# Patient Record
Sex: MALE | Race: WHITE | NOT HISPANIC OR LATINO | Employment: FULL TIME | URBAN - METROPOLITAN AREA
[De-identification: names, ages, dates, MRNs, and addresses within clinical notes are randomized per-mention and may not be internally consistent; named-entity substitution may affect disease eponyms.]

---
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
## 2022-07-20 ENCOUNTER — OFFICE VISIT (OUTPATIENT)
Dept: URGENT CARE | Facility: CLINIC | Age: 22
End: 2022-07-20
Payer: COMMERCIAL

## 2022-07-20 VITALS
OXYGEN SATURATION: 100 % | HEIGHT: 63 IN | RESPIRATION RATE: 14 BRPM | DIASTOLIC BLOOD PRESSURE: 88 MMHG | TEMPERATURE: 97 F | SYSTOLIC BLOOD PRESSURE: 119 MMHG | WEIGHT: 148 LBS | HEART RATE: 97 BPM | BODY MASS INDEX: 26.22 KG/M2

## 2022-07-20 DIAGNOSIS — R52 GENERALIZED BODY ACHES: ICD-10-CM

## 2022-07-20 DIAGNOSIS — B34.9 VIRAL INFECTION: Primary | ICD-10-CM

## 2022-07-20 PROCEDURE — 99203 OFFICE O/P NEW LOW 30 MIN: CPT | Performed by: PHYSICIAN ASSISTANT

## 2022-07-20 PROCEDURE — S9088 SERVICES PROVIDED IN URGENT: HCPCS | Performed by: PHYSICIAN ASSISTANT

## 2022-07-20 PROCEDURE — 87636 SARSCOV2 & INF A&B AMP PRB: CPT | Performed by: PHYSICIAN ASSISTANT

## 2022-07-20 NOTE — PROGRESS NOTES
[facility]        NAME: Nataliia Ferreira is a 23 y o  male  : 2000    MRN: [id number]  DATE: 2022  TIME: 7:26 PM    Assessment and Plan   Viral infection [B34 9]  1  Viral infection     2  Generalized body aches  Covid/Flu-Office Collect     Patient stable for discharge home at this time   Suspected COVID-19 infection or other viral infection  Swabbed for COVID-19/flu in office today  Continue supportive care including rest, hydration, acetaminophen and ibuprofen for pain and fever, OTC decongestants and nasal sprays, cough suppressants   Educated on return precautions to PCP or to ED for any new or worsening symptoms including difficulty breathing, chest pain, and high fever       Patient Instructions     Keep hydrated and rest as able  Check MY CHART in 24-48 hrs for Covid results  Home isolation until results come back; if + quarantine 5 days from the onset of symptoms, and fever free for 24 hrs  Wear your mask and wash hands often  PCP follow up in 3-5 days; call them first  Go to an emergency department if symptoms worsen, especially shortness or breath, weakness, or if unable to tolerate fluid intake  Chief Complaint     Chief Complaint   Patient presents with    Cold Like Symptoms    Fever         History of Present Illness       Patient is a 53-year-old male who presents with a CC of body aches and fever began in the morning  Does not know what his temperature was as his mom took his temperature while he was asleep  He states his brother and 2 nephews have roseola  Denies any nasal congestion, cough, sore throat, pain, nausea, vomiting, headaches  Also denies any rash  Review of Systems   Review of Systems   Constitutional: Positive for fever  Negative for fatigue  HENT: Negative for congestion and sore throat  Respiratory: Negative for cough and shortness of breath  Cardiovascular: Negative for chest pain     Gastrointestinal: Negative for abdominal pain, diarrhea, nausea and vomiting  Musculoskeletal: Positive for arthralgias and myalgias  Neurological: Negative for dizziness and headaches  Psychiatric/Behavioral: Negative for confusion  Current Medications       Current Outpatient Medications:     naproxen (NAPROSYN) 375 mg tablet, Take 1 tablet (375 mg total) by mouth 2 (two) times a day with meals, Disp: 20 tablet, Rfl: 0    Current Allergies     Allergies as of 07/20/2022    (No Known Allergies)            The following portions of the patient's history were reviewed and updated as appropriate: allergies, current medications, past family history, past medical history, past social history, past surgical history and problem list      No past medical history on file  No past surgical history on file  No family history on file  Medications have been verified  Objective   /88   Pulse 97   Temp (!) 97 °F (36 1 °C)   Resp 14   Ht 5' 3" (1 6 m)   Wt 67 1 kg (148 lb)   SpO2 100%   BMI 26 22 kg/m²        Physical Exam     Physical Exam  Constitutional:       General: He is not in acute distress  Appearance: Normal appearance  He is not ill-appearing or diaphoretic  HENT:      Right Ear: Tympanic membrane, ear canal and external ear normal       Left Ear: Tympanic membrane, ear canal and external ear normal       Nose: Nose normal       Mouth/Throat:      Mouth: Mucous membranes are moist       Pharynx: Oropharynx is clear  Eyes:      Conjunctiva/sclera: Conjunctivae normal    Cardiovascular:      Rate and Rhythm: Normal rate and regular rhythm  Heart sounds: Normal heart sounds  Pulmonary:      Effort: Pulmonary effort is normal       Breath sounds: Normal breath sounds  Lymphadenopathy:      Cervical: No cervical adenopathy  Skin:     General: Skin is warm and dry  Neurological:      Mental Status: He is alert     Psychiatric:         Mood and Affect: Mood normal          Behavior: Behavior normal

## 2022-07-21 LAB
FLUAV RNA RESP QL NAA+PROBE: NEGATIVE
FLUBV RNA RESP QL NAA+PROBE: NEGATIVE
SARS-COV-2 RNA RESP QL NAA+PROBE: NEGATIVE

## 2023-09-06 ENCOUNTER — HOSPITAL ENCOUNTER (EMERGENCY)
Facility: HOSPITAL | Age: 23
Discharge: HOME | End: 2023-09-06
Attending: EMERGENCY MEDICINE

## 2023-09-06 ENCOUNTER — APPOINTMENT (EMERGENCY)
Dept: RADIOLOGY | Facility: HOSPITAL | Age: 23
End: 2023-09-06

## 2023-09-06 VITALS
OXYGEN SATURATION: 99 % | WEIGHT: 150 LBS | DIASTOLIC BLOOD PRESSURE: 80 MMHG | HEART RATE: 75 BPM | RESPIRATION RATE: 19 BRPM | HEIGHT: 64 IN | TEMPERATURE: 98.5 F | SYSTOLIC BLOOD PRESSURE: 132 MMHG | BODY MASS INDEX: 25.61 KG/M2

## 2023-09-06 DIAGNOSIS — S60.10XA SUBUNGUAL HEMATOMA OF DIGIT OF HAND, INITIAL ENCOUNTER: Primary | ICD-10-CM

## 2023-09-06 PROCEDURE — 99284 EMERGENCY DEPT VISIT MOD MDM: CPT | Performed by: EMERGENCY MEDICINE

## 2023-09-06 PROCEDURE — 11740 EVACUATION SUBUNGUAL HMTMA: CPT | Performed by: EMERGENCY MEDICINE

## 2023-09-06 PROCEDURE — 99283 EMERGENCY DEPT VISIT LOW MDM: CPT

## 2023-09-06 PROCEDURE — 73130 X-RAY EXAM OF HAND: CPT

## 2023-09-07 NOTE — ED PROVIDER NOTES
History  Chief Complaint   Patient presents with   • Finger Injury     Pt reports R hand 3rd digit got stuck in car door 2 days ago. HPI patient is a 65-year-old male who got his right hand third digit stuck in a car door approximately 2 days ago. Apparently the door was slammed on the tip of his right middle finger. Patient reports severe swelling and pain under the nailbed and presents with a swollen finger with ecchymosis of the nailbed. Patient attempted to open the area himself and there is a small needle type hole in the middle of the nailbed but it is unable to puncture into the subungual hematoma. Patient denies any other injury. Past medical history previously healthy no family history contributory  Social history, right-handed    Prior to Admission Medications   Prescriptions Last Dose Informant Patient Reported? Taking?   naproxen (NAPROSYN) 375 mg tablet   No No   Sig: Take 1 tablet (375 mg total) by mouth 2 (two) times a day with meals      Facility-Administered Medications: None       History reviewed. No pertinent past medical history. History reviewed. No pertinent surgical history. History reviewed. No pertinent family history. I have reviewed and agree with the history as documented. E-Cigarette/Vaping     E-Cigarette/Vaping Substances     Social History     Tobacco Use   • Smoking status: Never   • Smokeless tobacco: Never   Substance Use Topics   • Alcohol use: Yes   • Drug use: No       Review of Systems   Neurological: Negative for weakness and numbness. Right middle finger injury    Physical Exam  Physical Exam  Constitutional:       Appearance: Normal appearance. Musculoskeletal:         General: Swelling present. Normal range of motion. Comments: The patient's right middle finger shows a markedly swollen distal phalanx with a large subungual hematoma that actually has raised the nailbed up. There is diffuse swelling of the distal phalanx.   Distal neurovascular tendon intact. Neurological:      Mental Status: He is alert. Vital Signs  ED Triage Vitals [09/06/23 2142]   Temperature Pulse Respirations Blood Pressure SpO2   98.5 °F (36.9 °C) 75 19 132/80 99 %      Temp Source Heart Rate Source Patient Position - Orthostatic VS BP Location FiO2 (%)   Temporal Monitor Sitting Left arm --      Pain Score       --           Vitals:    09/06/23 2142   BP: 132/80   Pulse: 75   Patient Position - Orthostatic VS: Sitting         Visual Acuity      ED Medications  Medications - No data to display    Diagnostic Studies  Results Reviewed     None                 XR hand 3+ views RIGHT   Final Result by Viridiana Tompkins MD (09/07 0545)      No acute osseous abnormality. Workstation performed: [id number]                    Procedures  Procedures         ED Course         Cleaning the nailbed with Betadine and using a hand-held Bovie I was able to open the subungual hematoma with a small hole through the fingernail. There is an immediate gush of blood and the patient had complete relief of his pain. There was marked improvement in the swelling of the finger with great reduction in the size of the distal tuft. X-ray of the right middle finger showed no fracture no dislocation no bony lesions interpreted by me I was the initial . , there was soft tissue swelling. SBIRT 20yo+    Flowsheet Row Most Recent Value   Initial Alcohol Screen: US AUDIT-C     1. How often do you have a drink containing alcohol? 0 Filed at: 09/06/2023 2211   2. How many drinks containing alcohol do you have on a typical day you are drinking? 0 Filed at: 09/06/2023 2211   3a. Male UNDER 65: How often do you have five or more drinks on one occasion? 0 Filed at: 09/06/2023 2211   Audit-C Score 0 Filed at: 09/06/2023 2211   ROLDAN: How many times in the past year have you. .. Used an illegal drug or used a prescription medication for non-medical reasons?  Never Filed at: 09/06/2023 2211                    Medical Decision Making  Medical decision making 61-year-old male presents emergency department with a severely swollen right middle finger at the distal phalanx at the nailbed consistent with a large subungual hematoma. Nail was trephinated with a hand-held Bovie. There was good expression of the subungual hematoma. Patient expressed immediate relief. Discussed outpatient treatment and follow-up discussed indications to return. Subungual hematoma of digit of hand, initial encounter: acute illness or injury  Amount and/or Complexity of Data Reviewed  Radiology: ordered. Disposition  Final diagnoses:   Subungual hematoma of digit of hand, initial encounter - Right middle finger     Time reflects when diagnosis was documented in both MDM as applicable and the Disposition within this note     Time User Action Codes Description Comment    9/6/2023 10:01 PM Zaid Adamson Add [S60.10XA] Subungual hematoma of digit of hand, initial encounter     9/6/2023 10:01 PM Zaid Adamson Modify [S60.10XA] Subungual hematoma of digit of hand, initial encounter Right middle finger      ED Disposition     ED Disposition   Discharge    Condition   Stable    Date/Time   Wed Sep 6, 2023 10:01 PM    Comment   [facility] discharge to home/self care. Follow-up Information     Follow up With Specialties Details Why [address], MD Orthopedic Surgery, Hand Surgery   [address] [place] [address]  [phone]            Discharge Medication List as of 9/6/2023 10:03 PM      CONTINUE these medications which have NOT CHANGED    Details   naproxen (NAPROSYN) 375 mg tablet Take 1 tablet (375 mg total) by mouth 2 (two) times a day with meals, Starting Sun 1/13/2019, Print             No discharge procedures on file.     PDMP Review     None          ED Provider  Electronically Signed by           Zaid Adamson MD  09/07/23 [address] [facility]

## 2023-09-07 NOTE — DISCHARGE INSTRUCTIONS
Allow anything to drain out that wants to drain out  The nail may turn white and chalky but you will grow a new nail out underneath  Return or follow-up with hand surgery increasing redness swelling or any sign of infection